# Patient Record
Sex: MALE | Race: WHITE | NOT HISPANIC OR LATINO | URBAN - METROPOLITAN AREA
[De-identification: names, ages, dates, MRNs, and addresses within clinical notes are randomized per-mention and may not be internally consistent; named-entity substitution may affect disease eponyms.]

---

## 2022-09-27 ENCOUNTER — OFFICE (OUTPATIENT)
Dept: URBAN - METROPOLITAN AREA CLINIC 10 | Facility: CLINIC | Age: 47
End: 2022-09-27

## 2022-09-27 VITALS
DIASTOLIC BLOOD PRESSURE: 84 MMHG | OXYGEN SATURATION: 98 % | WEIGHT: 251 LBS | HEIGHT: 72 IN | HEART RATE: 84 BPM | SYSTOLIC BLOOD PRESSURE: 134 MMHG

## 2022-09-27 DIAGNOSIS — N18.9 CHRONIC KIDNEY DISEASE, UNSPECIFIED: ICD-10-CM

## 2022-09-27 DIAGNOSIS — K92.1 MELENA: ICD-10-CM

## 2022-09-27 PROCEDURE — 99203 OFFICE O/P NEW LOW 30 MIN: CPT | Performed by: INTERNAL MEDICINE

## 2022-09-27 NOTE — SERVICEHPINOTES
Patient is a 47 year old  with history of diabetes, hypertension, dyslipidemia, PTSD, and CKD  who presents for evaluation for screening colonoscopy.   He has never had prior screening colonoscopy, and has no first-degree family history of colorectal cancer   He has a history of elevated creatinine, was apparently in the hospital earlier this year for further evaluation the creatinine.  I have requested the hospital records for my review.  Patient does not currently follow up with a nephrologist. he has had intermittent self-limited blood in the stool, but couple of these episodes were related to him lifting particularly heavy objects at work.   Patient has no history of prior abdominal surgeries.